# Patient Record
Sex: FEMALE | Race: WHITE | NOT HISPANIC OR LATINO
[De-identification: names, ages, dates, MRNs, and addresses within clinical notes are randomized per-mention and may not be internally consistent; named-entity substitution may affect disease eponyms.]

---

## 2022-01-18 ENCOUNTER — TRANSCRIPTION ENCOUNTER (OUTPATIENT)
Age: 62
End: 2022-01-18

## 2024-04-24 PROBLEM — Z00.00 ENCOUNTER FOR PREVENTIVE HEALTH EXAMINATION: Status: ACTIVE | Noted: 2024-04-24

## 2024-05-01 ENCOUNTER — APPOINTMENT (OUTPATIENT)
Dept: CARDIOLOGY | Facility: CLINIC | Age: 64
End: 2024-05-01
Payer: COMMERCIAL

## 2024-05-01 VITALS
OXYGEN SATURATION: 95 % | DIASTOLIC BLOOD PRESSURE: 88 MMHG | WEIGHT: 172 LBS | SYSTOLIC BLOOD PRESSURE: 112 MMHG | BODY MASS INDEX: 24.62 KG/M2 | HEIGHT: 70 IN | HEART RATE: 66 BPM

## 2024-05-01 VITALS — DIASTOLIC BLOOD PRESSURE: 88 MMHG | SYSTOLIC BLOOD PRESSURE: 110 MMHG

## 2024-05-01 DIAGNOSIS — N95.9 UNSPECIFIED MENOPAUSAL AND PERIMENOPAUSAL DISORDER: ICD-10-CM

## 2024-05-01 DIAGNOSIS — Z82.0 FAMILY HISTORY OF EPILEPSY AND OTHER DISEASES OF THE NERVOUS SYSTEM: ICD-10-CM

## 2024-05-01 DIAGNOSIS — Z82.49 FAMILY HISTORY OF ISCHEMIC HEART DISEASE AND OTHER DISEASES OF THE CIRCULATORY SYSTEM: ICD-10-CM

## 2024-05-01 DIAGNOSIS — Z87.891 PERSONAL HISTORY OF NICOTINE DEPENDENCE: ICD-10-CM

## 2024-05-01 DIAGNOSIS — Z83.518 FAMILY HISTORY OF OTHER SPECIFIED EYE DISORDER: ICD-10-CM

## 2024-05-01 DIAGNOSIS — Z82.5 FAMILY HISTORY OF ASTHMA AND OTHER CHRONIC LOWER RESPIRATORY DISEASES: ICD-10-CM

## 2024-05-01 DIAGNOSIS — Z80.8 FAMILY HISTORY OF MALIGNANT NEOPLASM OF OTHER ORGANS OR SYSTEMS: ICD-10-CM

## 2024-05-01 DIAGNOSIS — Z85.3 PERSONAL HISTORY OF MALIGNANT NEOPLASM OF BREAST: ICD-10-CM

## 2024-05-01 PROCEDURE — 99204 OFFICE O/P NEW MOD 45 MIN: CPT

## 2024-05-01 PROCEDURE — G2211 COMPLEX E/M VISIT ADD ON: CPT | Mod: NC,1L

## 2024-05-01 NOTE — REASON FOR VISIT
[Other: ____] : [unfilled] [FreeTextEntry1] : Loida Lopez is a 64-year-old female with history of breast CA lumpectomy RT 2009, remote tobacco, snoring, fatigue, IRBBB.  No history of CAD, MI, revascularization, VHD, CHF, TIA, CVA, diabetes, PVD, DVT, PE, arrhythmia, AF.  Patient has dyspnea with moderate exertion.  Cardiovascular review of symptoms is negative for exertional chest pain, palpitations, dizziness or syncope.  No PND or orthopnea leg edema.  No bleeding or black stool.  No exercise routine.  Patient is walking her dogs 15 minutes on occasion.  Patient has right hip pain limiting exercise capacity.  EKG PCP sinus rhythm 62 bpm, IRBBB

## 2024-05-01 NOTE — DISCUSSION/SUMMARY
[FreeTextEntry1] : Patient has medical history detailed above and active medical issues including:  - Dyspnea on exertion, fatigue, CAD risk factors, abnormal EKG.  Patient will have noninvasive testing with exercise stress echo to assess for obstructive CAD, HR and BP response, exercise-induced arrhythmia, echocardiogram for LVEF, structural heart disease, carotid and abdominal ultrasound to assess for obstructive PAD.  - Extreme fatigue, snoring, possible MICHOACANO.  Home sleep study ordered, Zio patch 1 week heart monitor started today.  Recommended increased oral hydration with electrolyte suppliment drinks, avoid caffeine and alcohol.  - Remote tobacco quit 30 years  - Mechanical fall, chronic right hip pain  - Family history CHF  Patient will be seen in cardiology follow-up after noninvasive testing.  Advised patient to follow active lifestyle with regular cardiovascular exercise. Patient educated on heart healthy diet. Recommend increased oral hydration with electrolyte supplement drinks, avoid excess alcohol and caffeine.  Patient is aware to call with any symptoms or concerns.  Loida Lopez will follow-up with Dr Flavia Diamond for primary care.  Total time spent 45 minutes, reviewing of test results, chart information, patient discussion, physical exam and completion of chart documentation.

## 2024-05-20 ENCOUNTER — APPOINTMENT (OUTPATIENT)
Dept: CARDIOLOGY | Facility: CLINIC | Age: 64
End: 2024-05-20
Payer: COMMERCIAL

## 2024-05-20 PROCEDURE — 93880 EXTRACRANIAL BILAT STUDY: CPT

## 2024-05-20 PROCEDURE — 93306 TTE W/DOPPLER COMPLETE: CPT

## 2024-05-23 ENCOUNTER — APPOINTMENT (OUTPATIENT)
Dept: CARDIOLOGY | Facility: CLINIC | Age: 64
End: 2024-05-23
Payer: COMMERCIAL

## 2024-05-23 PROCEDURE — 93978 VASCULAR STUDY: CPT

## 2024-05-23 PROCEDURE — 93351 STRESS TTE COMPLETE: CPT

## 2024-05-23 PROCEDURE — 93320 DOPPLER ECHO COMPLETE: CPT

## 2024-06-12 ENCOUNTER — APPOINTMENT (OUTPATIENT)
Dept: CARDIOLOGY | Facility: CLINIC | Age: 64
End: 2024-06-12
Payer: COMMERCIAL

## 2024-06-12 VITALS
BODY MASS INDEX: 24.62 KG/M2 | HEART RATE: 83 BPM | OXYGEN SATURATION: 65 % | HEIGHT: 70 IN | DIASTOLIC BLOOD PRESSURE: 80 MMHG | SYSTOLIC BLOOD PRESSURE: 112 MMHG | WEIGHT: 172 LBS

## 2024-06-12 DIAGNOSIS — R06.09 OTHER FORMS OF DYSPNEA: ICD-10-CM

## 2024-06-12 DIAGNOSIS — E78.5 HYPERLIPIDEMIA, UNSPECIFIED: ICD-10-CM

## 2024-06-12 DIAGNOSIS — R53.83 OTHER FATIGUE: ICD-10-CM

## 2024-06-12 PROCEDURE — 99214 OFFICE O/P EST MOD 30 MIN: CPT

## 2024-06-12 RX ORDER — COLD-HOT PACK
EACH MISCELLANEOUS
Refills: 0 | Status: ACTIVE | COMMUNITY

## 2024-06-12 RX ORDER — TURMERIC ROOT EXTRACT 500 MG
TABLET ORAL
Refills: 0 | Status: ACTIVE | COMMUNITY

## 2024-06-12 RX ORDER — MV-MN/OM3/DHA/EPA/FISH/LUT/ZEA 250-5-1 MG
CAPSULE ORAL
Refills: 0 | Status: ACTIVE | COMMUNITY

## 2024-06-12 RX ORDER — LORAZEPAM 1 MG/1
1 TABLET ORAL TWICE DAILY
Refills: 0 | Status: ACTIVE | COMMUNITY

## 2024-06-12 NOTE — HISTORY OF PRESENT ILLNESS
[FreeTextEntry1] : DANELLE GUEVARA is a 64 year old female with a past medical history of breast CA lumpectomy RT 2009, remote tobacco, snoring, fatigue, IRBBB.  Last seen 5/2024 with complaints of fatigue, snoring, and hip pain as well as GUERRIER. Stress echo, echo, carotid, abd, sleep study, and Zio patch ordered and she presents today for review. See details below. Zio and sleep study not done yet. She denies chest pain, pressure, palpitations, unusual shortness of breath, orthopnea, LE edema, lightheadedness, dizziness, near syncope or syncope. Former smoker. Active with pilates, stretching and swilling.  Testing:  Stress echo 5/2024: BP 6 min. 7.1 mets. Negative for exercise induce dischemia by EKG and stress echo.  Echo 5/2024: EF 60%. Mild MR. Mitral regurgitant jet is directed towards the free wall. Trace to mild TR. Trace MO. PASP 22mmHg.   Carotid u/s 5/2024: Nonobs.  Abd u/s 5/2024: No evidence of AAA. Mild plaque.  Labs 5/2024: Na 139, K 4.2, Cr 0.8, Ca 9.5, ALT 13, AST 21, Trigs 89, HDL 60, , Chol 240, Mag 2.3, A1C 5.2, , TSH 1.66, LPa 46,   EKG PCP sinus rhythm 62 bpm, IRBBB

## 2024-06-12 NOTE — DISCUSSION/SUMMARY
[FreeTextEntry1] : DANELLE GUEVARA is a 64 year old F who presents today Jun 12, 2024 with the above history and the following active issues:   Testing unrevealing as noted above.  - Extreme fatigue, snoring, possible MICHOACANO. Home sleep study ordered today, Zio patch 1 week heart monitor started today. Recommended increased oral hydration with electrolyte suppliment drinks, avoid caffeine and alcohol.  . ASCVD 3.4%. Patient wishes to attempt dietary measures prior to starting lipid lowering therapy. Will also obtain CT calcium score for risk stratification.  - Remote tobacco quit 30 years Repeat labs in 2-3 months.  - Mechanical fall, chronic right hip pain  - Family history CHF  Ongoing f/u with PCP.  F/U after testing to review results. Discussed red flag symptoms, which would warrant sooner or emergent medical evaluation. Any questions and concerns were addressed and resolved.  Sincerely, Gay [facility] Patient's history, testing, and plan was reviewed with supervising physician, Dr. Patrick Valentino

## 2024-07-26 ENCOUNTER — APPOINTMENT (OUTPATIENT)
Dept: ORTHOPEDIC SURGERY | Facility: CLINIC | Age: 64
End: 2024-07-26

## 2024-08-20 ENCOUNTER — APPOINTMENT (OUTPATIENT)
Dept: CARDIOLOGY | Facility: CLINIC | Age: 64
End: 2024-08-20

## 2024-08-28 NOTE — HISTORY OF PRESENT ILLNESS
[FreeTextEntry1] : DANELLE GLOVER has a past medical history of breast CA lumpectomy RT 2009, remote tobacco, snoring, fatigue, IRBBB.  Last seen 5/2024 with complaints of fatigue, snoring, and hip pain as well as GUERRIER. Stress echo, echo, carotid, abd, sleep study, and Zio patch ordered and she presents today for review. See details below. Zio and sleep study not done yet. She denies chest pain, pressure, palpitations, unusual shortness of breath, orthopnea, LE edema, lightheadedness, dizziness, near syncope or syncope. Former smoker. Active with pilates, stretching and swilling.  Testing:  Stress echo 5/2024: BP 6 min. 7.1 mets. Negative for exercise induce dischemia by EKG and stress echo.  Echo 5/2024: EF 60%. Mild MR. Mitral regurgitant jet is directed towards the free wall. Trace to mild TR. Trace RI. PASP 22mmHg.   Carotid u/s 5/2024: Nonobs.  Abd u/s 5/2024: No evidence of AAA. Mild plaque.  Labs 5/2024: Na 139, K 4.2, Cr 0.8, Ca 9.5, ALT 13, AST 21, Trigs 89, HDL 60, , Chol 240, Mag 2.3, A1C 5.2, , TSH 1.66, LPa 46,   EKG PCP sinus rhythm 62 bpm, IRBBB

## 2024-08-28 NOTE — DISCUSSION/SUMMARY
[FreeTextEntry1] : Patient has medical history detailed above and active medical issues including:  - Extreme fatigue, snoring, possible MICHOACANO. Home sleep study ordered today, Zio patch 1 week heart monitor started today. Recommended increased oral hydration with electrolyte suppliment drinks, avoid caffeine and alcohol.  . ASCVD 3.4%. Patient wishes to attempt dietary measures prior to starting lipid lowering therapy. Will also obtain CT calcium score for risk stratification.  - Remote tobacco quit 30 years Repeat labs in 2-3 months.  - Mechanical fall, chronic right hip pain  - Family history CHF  Ongoing f/u with PCP.  F/U after testing to review results. Discussed red flag symptoms, which would warrant sooner or emergent medical evaluation. Any questions and concerns were addressed and resolved.  Advised patient to follow active lifestyle with regular cardiovascular exercise. Patient educated on heart healthy diet. Recommend increased oral hydration with electrolyte supplement drinks, avoid excess alcohol and caffeine.  Patient is aware to call with any symptoms or concerns.  Loida Lopez will follow-up with Dr. Flavia Argueta for primary care  Total time spent 45 minutes, reviewing of test results, chart information, patient discussion, physical exam and completion of chart documentation.

## 2024-09-04 ENCOUNTER — APPOINTMENT (OUTPATIENT)
Dept: CARDIOLOGY | Facility: CLINIC | Age: 64
End: 2024-09-04

## 2024-09-04 DIAGNOSIS — R06.09 OTHER FORMS OF DYSPNEA: ICD-10-CM

## 2024-09-04 DIAGNOSIS — R53.83 OTHER FATIGUE: ICD-10-CM

## 2024-09-04 DIAGNOSIS — E78.5 HYPERLIPIDEMIA, UNSPECIFIED: ICD-10-CM
